# Patient Record
Sex: FEMALE | Race: WHITE | NOT HISPANIC OR LATINO | Employment: PART TIME | URBAN - METROPOLITAN AREA
[De-identification: names, ages, dates, MRNs, and addresses within clinical notes are randomized per-mention and may not be internally consistent; named-entity substitution may affect disease eponyms.]

---

## 2024-02-14 ENCOUNTER — INITIAL PRENATAL (OUTPATIENT)
Dept: OBSTETRICS AND GYNECOLOGY | Facility: CLINIC | Age: 19
End: 2024-02-14
Payer: COMMERCIAL

## 2024-02-14 VITALS
HEIGHT: 64 IN | SYSTOLIC BLOOD PRESSURE: 122 MMHG | DIASTOLIC BLOOD PRESSURE: 78 MMHG | WEIGHT: 166.4 LBS | BODY MASS INDEX: 28.41 KG/M2

## 2024-02-14 DIAGNOSIS — D50.9 IRON DEFICIENCY ANEMIA DURING PREGNANCY: ICD-10-CM

## 2024-02-14 DIAGNOSIS — O26.849 UTERINE SIZE DATE DISCREPANCY PREGNANCY: ICD-10-CM

## 2024-02-14 DIAGNOSIS — Z34.90 PREGNANCY, UNSPECIFIED GESTATIONAL AGE: ICD-10-CM

## 2024-02-14 DIAGNOSIS — O99.019 IRON DEFICIENCY ANEMIA DURING PREGNANCY: ICD-10-CM

## 2024-02-14 DIAGNOSIS — Z34.93 PRENATAL CARE IN THIRD TRIMESTER: Primary | ICD-10-CM

## 2024-02-14 RX ORDER — SENNOSIDES 8.6 MG
650 CAPSULE ORAL EVERY 8 HOURS PRN
COMMUNITY

## 2024-02-14 RX ORDER — PRENATAL VIT NO.126/IRON/FOLIC 28MG-0.8MG
TABLET ORAL DAILY
COMMUNITY

## 2024-02-14 RX ORDER — FERROUS SULFATE 325(65) MG
325 TABLET ORAL
Qty: 30 TABLET | Refills: 4 | Status: SHIPPED | OUTPATIENT
Start: 2024-02-14 | End: 2024-07-13

## 2024-02-15 LAB
ERYTHROCYTE [DISTWIDTH] IN BLOOD BY AUTOMATED COUNT: 13.3 % (ref 12.3–15.4)
FERRITIN SERPL-MCNC: 11.6 NG/ML (ref 13–150)
HCT VFR BLD AUTO: 34.5 % (ref 34–46.6)
HGB BLD-MCNC: 11.6 G/DL (ref 12–15.9)
IRON SATN MFR SERPL: 12 % (ref 20–50)
IRON SERPL-MCNC: 76 MCG/DL (ref 37–145)
MCH RBC QN AUTO: 29.2 PG (ref 26.6–33)
MCHC RBC AUTO-ENTMCNC: 33.6 G/DL (ref 31.5–35.7)
MCV RBC AUTO: 86.9 FL (ref 79–97)
PLATELET # BLD AUTO: 236 10*3/MM3 (ref 140–450)
RBC # BLD AUTO: 3.97 10*6/MM3 (ref 3.77–5.28)
TIBC SERPL-MCNC: 615 MCG/DL
UIBC SERPL-MCNC: 539 MCG/DL (ref 112–346)
WBC # BLD AUTO: 11.33 10*3/MM3 (ref 3.4–10.8)

## 2024-02-21 ENCOUNTER — OFFICE VISIT (OUTPATIENT)
Dept: OBSTETRICS AND GYNECOLOGY | Facility: HOSPITAL | Age: 19
End: 2024-02-21
Payer: COMMERCIAL

## 2024-02-21 ENCOUNTER — ROUTINE PRENATAL (OUTPATIENT)
Dept: OBSTETRICS AND GYNECOLOGY | Facility: CLINIC | Age: 19
End: 2024-02-21
Payer: COMMERCIAL

## 2024-02-21 ENCOUNTER — HOSPITAL ENCOUNTER (OUTPATIENT)
Dept: WOMENS IMAGING | Facility: HOSPITAL | Age: 19
Discharge: HOME | End: 2024-02-21
Admitting: OBSTETRICS & GYNECOLOGY
Payer: COMMERCIAL

## 2024-02-21 ENCOUNTER — HOSPITAL ENCOUNTER (INPATIENT)
Facility: HOSPITAL | Age: 19
LOS: 4 days | Discharge: HOME | End: 2024-02-25
Attending: OBSTETRICS & GYNECOLOGY | Admitting: OBSTETRICS & GYNECOLOGY
Payer: COMMERCIAL

## 2024-02-21 ENCOUNTER — LAB (OUTPATIENT)
Dept: LAB | Facility: HOSPITAL | Age: 19
End: 2024-02-21
Payer: COMMERCIAL

## 2024-02-21 VITALS — BODY MASS INDEX: 29.32 KG/M2 | DIASTOLIC BLOOD PRESSURE: 78 MMHG | WEIGHT: 170.8 LBS | SYSTOLIC BLOOD PRESSURE: 122 MMHG

## 2024-02-21 DIAGNOSIS — Z34.90 PREGNANCY, UNSPECIFIED GESTATIONAL AGE: ICD-10-CM

## 2024-02-21 DIAGNOSIS — O36.5990 POOR FETAL GROWTH AFFECTING MANAGEMENT OF MOTHER, ANTEPARTUM, SINGLE OR UNSPECIFIED FETUS: Primary | ICD-10-CM

## 2024-02-21 DIAGNOSIS — Z34.90 PREGNANCY, UNSPECIFIED GESTATIONAL AGE: Primary | ICD-10-CM

## 2024-02-21 DIAGNOSIS — Z3A.36 36 WEEKS GESTATION OF PREGNANCY: ICD-10-CM

## 2024-02-21 DIAGNOSIS — O26.849 UTERINE SIZE DATE DISCREPANCY PREGNANCY: ICD-10-CM

## 2024-02-21 DIAGNOSIS — O99.019 IRON DEFICIENCY ANEMIA DURING PREGNANCY: ICD-10-CM

## 2024-02-21 DIAGNOSIS — D50.9 IRON DEFICIENCY ANEMIA DURING PREGNANCY: ICD-10-CM

## 2024-02-21 PROBLEM — O36.5931 IUGR (INTRAUTERINE GROWTH RESTRICTION) AFFECTING CARE OF MOTHER, THIRD TRIMESTER, FETUS 1: Status: ACTIVE | Noted: 2024-02-21

## 2024-02-21 LAB
ABO GROUP BLD: NORMAL
ABO GROUP BLD: NORMAL
ALP SERPL-CCNC: 230 U/L (ref 39–117)
ALT SERPL W P-5'-P-CCNC: 15 U/L (ref 1–33)
AST SERPL-CCNC: 25 U/L (ref 1–32)
BILIRUB SERPL-MCNC: 0.2 MG/DL (ref 0–1.2)
BLD GP AB SCN SERPL QL: NEGATIVE
CREAT SERPL-MCNC: 0.57 MG/DL (ref 0.57–1)
DEPRECATED RDW RBC AUTO: 43 FL (ref 37–54)
ERYTHROCYTE [DISTWIDTH] IN BLOOD BY AUTOMATED COUNT: 13.5 % (ref 12.3–15.4)
GLUCOSE UR STRIP-MCNC: NEGATIVE MG/DL
HCT VFR BLD AUTO: 33.3 % (ref 34–46.6)
HGB BLD-MCNC: 11.4 G/DL (ref 12–15.9)
LDH SERPL-CCNC: 219 U/L (ref 135–214)
MCH RBC QN AUTO: 30 PG (ref 26.6–33)
MCHC RBC AUTO-ENTMCNC: 34.2 G/DL (ref 31.5–35.7)
MCV RBC AUTO: 87.6 FL (ref 79–97)
PLATELET # BLD AUTO: 211 10*3/MM3 (ref 140–450)
PMV BLD AUTO: 10.3 FL (ref 6–12)
PROT UR STRIP-MCNC: NEGATIVE MG/DL
RBC # BLD AUTO: 3.8 10*6/MM3 (ref 3.77–5.28)
RH BLD: POSITIVE
RH BLD: POSITIVE
T&S EXPIRATION DATE: NORMAL
TSH SERPL DL<=0.05 MIU/L-ACNC: 2.26 UIU/ML (ref 0.27–4.2)
URATE SERPL-MCNC: 4.5 MG/DL (ref 2.4–5.7)
WBC NRBC COR # BLD AUTO: 12.93 10*3/MM3 (ref 3.4–10.8)

## 2024-02-21 PROCEDURE — 85613 RUSSELL VIPER VENOM DILUTED: CPT | Performed by: OBSTETRICS & GYNECOLOGY

## 2024-02-21 PROCEDURE — 84550 ASSAY OF BLOOD/URIC ACID: CPT | Performed by: OBSTETRICS & GYNECOLOGY

## 2024-02-21 PROCEDURE — 85732 THROMBOPLASTIN TIME PARTIAL: CPT | Performed by: OBSTETRICS & GYNECOLOGY

## 2024-02-21 PROCEDURE — 82247 BILIRUBIN TOTAL: CPT | Performed by: OBSTETRICS & GYNECOLOGY

## 2024-02-21 PROCEDURE — 84443 ASSAY THYROID STIM HORMONE: CPT | Performed by: OBSTETRICS & GYNECOLOGY

## 2024-02-21 PROCEDURE — 84075 ASSAY ALKALINE PHOSPHATASE: CPT | Performed by: OBSTETRICS & GYNECOLOGY

## 2024-02-21 PROCEDURE — 84450 TRANSFERASE (AST) (SGOT): CPT | Performed by: OBSTETRICS & GYNECOLOGY

## 2024-02-21 PROCEDURE — 85705 THROMBOPLASTIN INHIBITION: CPT | Performed by: OBSTETRICS & GYNECOLOGY

## 2024-02-21 PROCEDURE — 87081 CULTURE SCREEN ONLY: CPT

## 2024-02-21 PROCEDURE — 76820 UMBILICAL ARTERY ECHO: CPT

## 2024-02-21 PROCEDURE — 82565 ASSAY OF CREATININE: CPT | Performed by: OBSTETRICS & GYNECOLOGY

## 2024-02-21 PROCEDURE — 81291 MTHFR GENE: CPT | Performed by: OBSTETRICS & GYNECOLOGY

## 2024-02-21 PROCEDURE — 86901 BLOOD TYPING SEROLOGIC RH(D): CPT

## 2024-02-21 PROCEDURE — 76811 OB US DETAILED SNGL FETUS: CPT

## 2024-02-21 PROCEDURE — 86901 BLOOD TYPING SEROLOGIC RH(D): CPT | Performed by: OBSTETRICS & GYNECOLOGY

## 2024-02-21 PROCEDURE — 86900 BLOOD TYPING SEROLOGIC ABO: CPT | Performed by: OBSTETRICS & GYNECOLOGY

## 2024-02-21 PROCEDURE — 84460 ALANINE AMINO (ALT) (SGPT): CPT | Performed by: OBSTETRICS & GYNECOLOGY

## 2024-02-21 PROCEDURE — 86147 CARDIOLIPIN ANTIBODY EA IG: CPT | Performed by: OBSTETRICS & GYNECOLOGY

## 2024-02-21 PROCEDURE — 85670 THROMBIN TIME PLASMA: CPT | Performed by: OBSTETRICS & GYNECOLOGY

## 2024-02-21 PROCEDURE — 83615 LACTATE (LD) (LDH) ENZYME: CPT | Performed by: OBSTETRICS & GYNECOLOGY

## 2024-02-21 PROCEDURE — 86780 TREPONEMA PALLIDUM: CPT | Performed by: OBSTETRICS & GYNECOLOGY

## 2024-02-21 PROCEDURE — 86900 BLOOD TYPING SEROLOGIC ABO: CPT

## 2024-02-21 PROCEDURE — 76819 FETAL BIOPHYS PROFIL W/O NST: CPT

## 2024-02-21 PROCEDURE — 86850 RBC ANTIBODY SCREEN: CPT | Performed by: OBSTETRICS & GYNECOLOGY

## 2024-02-21 PROCEDURE — 25010000002 CEFAZOLIN PER 500 MG: Performed by: OBSTETRICS & GYNECOLOGY

## 2024-02-21 PROCEDURE — 85027 COMPLETE CBC AUTOMATED: CPT | Performed by: OBSTETRICS & GYNECOLOGY

## 2024-02-21 RX ORDER — SODIUM CHLORIDE, SODIUM LACTATE, POTASSIUM CHLORIDE, CALCIUM CHLORIDE 600; 310; 30; 20 MG/100ML; MG/100ML; MG/100ML; MG/100ML
125 INJECTION, SOLUTION INTRAVENOUS CONTINUOUS
Status: DISCONTINUED | OUTPATIENT
Start: 2024-02-21 | End: 2024-02-23

## 2024-02-21 RX ORDER — PROMETHAZINE HYDROCHLORIDE 12.5 MG/1
12.5 TABLET ORAL EVERY 6 HOURS PRN
Status: DISCONTINUED | OUTPATIENT
Start: 2024-02-21 | End: 2024-02-23 | Stop reason: HOSPADM

## 2024-02-21 RX ORDER — MORPHINE SULFATE 2 MG/ML
1 INJECTION, SOLUTION INTRAMUSCULAR; INTRAVENOUS EVERY 4 HOURS PRN
Status: DISCONTINUED | OUTPATIENT
Start: 2024-02-21 | End: 2024-02-23 | Stop reason: HOSPADM

## 2024-02-21 RX ORDER — NALOXONE HCL 0.4 MG/ML
0.4 VIAL (ML) INJECTION
Status: DISCONTINUED | OUTPATIENT
Start: 2024-02-21 | End: 2024-02-23 | Stop reason: HOSPADM

## 2024-02-21 RX ORDER — SODIUM CHLORIDE 0.9 % (FLUSH) 0.9 %
10 SYRINGE (ML) INJECTION EVERY 12 HOURS SCHEDULED
Status: DISCONTINUED | OUTPATIENT
Start: 2024-02-21 | End: 2024-02-23 | Stop reason: HOSPADM

## 2024-02-21 RX ORDER — PROMETHAZINE HYDROCHLORIDE 12.5 MG/1
12.5 SUPPOSITORY RECTAL EVERY 6 HOURS PRN
Status: DISCONTINUED | OUTPATIENT
Start: 2024-02-21 | End: 2024-02-23 | Stop reason: HOSPADM

## 2024-02-21 RX ORDER — ONDANSETRON 2 MG/ML
4 INJECTION INTRAMUSCULAR; INTRAVENOUS EVERY 6 HOURS PRN
Status: DISCONTINUED | OUTPATIENT
Start: 2024-02-21 | End: 2024-02-23 | Stop reason: HOSPADM

## 2024-02-21 RX ORDER — SODIUM CHLORIDE 9 MG/ML
40 INJECTION, SOLUTION INTRAVENOUS AS NEEDED
Status: DISCONTINUED | OUTPATIENT
Start: 2024-02-21 | End: 2024-02-23 | Stop reason: HOSPADM

## 2024-02-21 RX ORDER — MAGNESIUM CARB/ALUMINUM HYDROX 105-160MG
30 TABLET,CHEWABLE ORAL ONCE
Status: DISCONTINUED | OUTPATIENT
Start: 2024-02-21 | End: 2024-02-23 | Stop reason: HOSPADM

## 2024-02-21 RX ORDER — LIDOCAINE HYDROCHLORIDE 10 MG/ML
0.5 INJECTION, SOLUTION EPIDURAL; INFILTRATION; INTRACAUDAL; PERINEURAL ONCE AS NEEDED
Status: DISCONTINUED | OUTPATIENT
Start: 2024-02-21 | End: 2024-02-23 | Stop reason: HOSPADM

## 2024-02-21 RX ORDER — ACETAMINOPHEN 325 MG/1
650 TABLET ORAL EVERY 4 HOURS PRN
Status: DISCONTINUED | OUTPATIENT
Start: 2024-02-21 | End: 2024-02-23 | Stop reason: HOSPADM

## 2024-02-21 RX ORDER — ONDANSETRON 4 MG/1
4 TABLET, ORALLY DISINTEGRATING ORAL EVERY 6 HOURS PRN
Status: DISCONTINUED | OUTPATIENT
Start: 2024-02-21 | End: 2024-02-23 | Stop reason: HOSPADM

## 2024-02-21 RX ORDER — SODIUM CHLORIDE 0.9 % (FLUSH) 0.9 %
10 SYRINGE (ML) INJECTION AS NEEDED
Status: DISCONTINUED | OUTPATIENT
Start: 2024-02-21 | End: 2024-02-23 | Stop reason: HOSPADM

## 2024-02-21 RX ORDER — OXYTOCIN/0.9 % SODIUM CHLORIDE 30/500 ML
2-20 PLASTIC BAG, INJECTION (ML) INTRAVENOUS
Status: DISCONTINUED | OUTPATIENT
Start: 2024-02-21 | End: 2024-02-23 | Stop reason: SDUPTHER

## 2024-02-21 RX ADMIN — SODIUM CHLORIDE 2000 MG: 900 INJECTION INTRAVENOUS at 20:10

## 2024-02-21 RX ADMIN — Medication 2 MILLI-UNITS/MIN: at 20:10

## 2024-02-21 NOTE — PROGRESS NOTES
OB FOLLOW UP  CC- Here for care of pregnancy        Falguni Daniel is a 18 y.o.  36w6d patient being seen today for her obstetrical follow up visit. Patient reports no complaints.     Her prenatal care is complicated by (and status) :   Patient Active Problem List   Diagnosis    Pregnancy    Iron deficiency anemia during pregnancy    Uterine size date discrepancy pregnancy       GBS Status: done today    Allergies   Allergen Reactions    Penicillins Hives          Flu Status: Declines  Her Delivery Plan is: Undecided    US today: yes  Non Stress Test: No.    ROS -   Patient Denies: Loss of Fluid, Vaginal Spotting, Vision Changes, Headaches, Nausea , Vomiting , Contractions, Epigastric pain, and skin itching  Fetal Movement : normal  All other systems reviewed and are negative.       The additional following portions of the patient's history were reviewed and updated as appropriate: allergies, current medications, past family history, past medical history, past social history, past surgical history, and problem list.    I have reviewed and agree with the HPI, ROS, and historical information as entered above.   Frederic Woods MD        EXAM:     Prenatal Vitals  BP: 122/78  Weight: 77.5 kg (170 lb 12.8 oz)   Fetal Heart Rate: 133 US       Dilation/Effacement/Station  Dilation: Fingertip  Effacement (%): 70  Station: 0      Urine Glucose Read-only: Negative  Urine Protein Read-only: Negative           Assessment and Plan    Problem List Items Addressed This Visit              Iron deficiency anemia during pregnancy    Overview     2024 hematocrit 34.5 with low iron saturation and ferritin.  Has iron sulfate 325 mg once daily sent to pharmacy.         Relevant Medications    ferrous sulfate 325 (65 FE) MG tablet    Pregnancy - Primary    Relevant Orders    POC Urinalysis Dipstick (Completed)    Group B Streptococcus Culture - Swab, Vaginal/Rectum    Uterine size date discrepancy pregnancy     Overview     2/14/2024 measures small for dates.  Growth ultrasound next visit.            Pregnancy at 36w6d; B strep screen done today.  IUGR with estimated fetal weight today of 4%.  AC 1%.  Evaluation today by PDC with Dopplers.  Discussed twice weekly fetal surveillance with NST and biophysical profile and possible early delivery.  Reviewed Pre-eclampsia signs/symptoms  Discussed possible induction of labor at 37 to 39 weeks.  Delivery options reviewed with patient  Signs of labor reviewed  Kick counts reviewed  Activity and Exercise discussed.  Return in about 5 days (around 2/26/2024) for NST MON; BPP Fri. .    Frederic Woods MD  02/21/2024

## 2024-02-21 NOTE — H&P
History and Physical   [facility]    No chief complaint on file.        Pregnancy    Iron deficiency anemia during pregnancy    IUGR (intrauterine growth restriction) affecting care of mother, third trimester, fetus 1       Falguni Daniel is a 18 y.o. year old  with an Estimated Date of Delivery: 3/14/24 currently at 36w6d presenting with for induction of labor for IUGR.  She currently reports normal fetal movement, no contractions, no leaking, and no vaginal bleeding    Prenatal care has been with Dr. Woods.  It has been significant for IUGR and ultrasound today at [facility] with elevated Dopplers with delivery recommended. .    Active Hospital Problems    Diagnosis     IUGR (intrauterine growth restriction) affecting care of mother, third trimester, fetus 1     Pregnancy     Iron deficiency anemia during pregnancy         Risks of labor induction including prolongation of labor, increased risks for both  section and operative vaginal birth have been discussed at length.     Denies any headaches vision changes or swelling.  Blood pressures have been normal.    The following portions of the patient's history were reviewed and updated as appropriate:vital signs, allergies, current medications, past medical history, past social history, past surgical history, and problem list.      OBSTETRIC HISTORY  OB History    Para Term  AB Living   1 0 0 0 0 0   SAB IAB Ectopic Molar Multiple Live Births   0 0 0 0 0 0      # Outcome Date GA Lbr Karthik/2nd Weight Sex Delivery Anes PTL Lv   1 Current                 PAST MEDICAL HISTORY  Past Medical History:   Diagnosis Date    PMS (premenstrual syndrome)        PAST SURGICAL HISTORY  Past Surgical History:   Procedure Laterality Date    LAPAROSCOPIC CHOLECYSTECTOMY         ALLERGIES  Penicillins    MEDICATIONS  No current facility-administered medications for this encounter.      Objective     There were no vitals taken for this  "visit.    Physical Exam    General:  well developed; well nourished  no acute distress           Abdomen: soft, non-tender; no masses  fundus firm and non-tender       FHT's: reactive and category 1   Cervix: 1 cm dilated, 75 effaced, -1 station, cervical position posterior   [place]: Contraction are rare     Lab Review       Labs: No data reviewed     Prenatal Labs  Lab Results (last 24 hours)       ** No results found for the last 24 hours. **          Lab Results   Component Value Date    HGB 11.6 (L) 02/14/2024       Imaging   PDC ultrasound reviewed.  Imaging Results (Last 24 Hours)       ** No results found for the last 24 hours. **            Assessment & Plan     ASSESSMENT  IUP at 36w6d  Induction of labor indication: IUGR and elevated Dopplers  Group B strep status:   No results found for: \"STREPGPB\"   4.   Patient's anemia.  Patient has not been taking iron regularly.                Active Hospital Problems    Diagnosis     IUGR (intrauterine growth restriction) affecting care of mother, third trimester, fetus 1      Ultrasound 2/21/2024 with estimated fetal weight of 4 percentile.  AC less than 1%.    2/21/2024 PDC ultrasound with IUGR and elevated Dopplers; needs induction of labor.     NST every Monday and biophysical profile every Friday.      Pregnancy     Iron deficiency anemia during pregnancy      2/14/2024 hematocrit 34.5 with low iron saturation and ferritin.  Has iron sulfate 325 mg once daily sent to pharmacy.          PLAN  Induction of labor with cervical ripening with Clark balloon and low-dose Pitocin overnight.         Frederic Woods MD  2/21/202417:50 EST  "

## 2024-02-22 ENCOUNTER — ANESTHESIA (OUTPATIENT)
Dept: LABOR AND DELIVERY | Facility: HOSPITAL | Age: 19
End: 2024-02-22
Payer: COMMERCIAL

## 2024-02-22 ENCOUNTER — ANESTHESIA EVENT (OUTPATIENT)
Dept: LABOR AND DELIVERY | Facility: HOSPITAL | Age: 19
End: 2024-02-22
Payer: COMMERCIAL

## 2024-02-22 LAB — T PALLIDUM IGG SER QL: NORMAL

## 2024-02-22 PROCEDURE — 25010000002 ROPIVACAINE PER 1 MG: Performed by: NURSE ANESTHETIST, CERTIFIED REGISTERED

## 2024-02-22 PROCEDURE — 51702 INSERT TEMP BLADDER CATH: CPT

## 2024-02-22 PROCEDURE — 59200 INSERT CERVICAL DILATOR: CPT | Performed by: OBSTETRICS & GYNECOLOGY

## 2024-02-22 PROCEDURE — 25810000003 LACTATED RINGERS PER 1000 ML: Performed by: OBSTETRICS & GYNECOLOGY

## 2024-02-22 PROCEDURE — 25010000002 METOCLOPRAMIDE PER 10 MG: Performed by: NURSE ANESTHETIST, CERTIFIED REGISTERED

## 2024-02-22 PROCEDURE — 25810000003 LACTATED RINGERS SOLUTION: Performed by: OBSTETRICS & GYNECOLOGY

## 2024-02-22 PROCEDURE — 25810000003 LACTATED RINGERS SOLUTION: Performed by: NURSE ANESTHETIST, CERTIFIED REGISTERED

## 2024-02-22 PROCEDURE — C1755 CATHETER, INTRASPINAL: HCPCS

## 2024-02-22 PROCEDURE — 59025 FETAL NON-STRESS TEST: CPT

## 2024-02-22 PROCEDURE — 25010000002 FENTANYL CITRATE (PF) 50 MCG/ML SOLUTION: Performed by: NURSE ANESTHETIST, CERTIFIED REGISTERED

## 2024-02-22 PROCEDURE — 25010000002 ONDANSETRON PER 1 MG: Performed by: OBSTETRICS & GYNECOLOGY

## 2024-02-22 PROCEDURE — 25010000002 CEFAZOLIN PER 500 MG: Performed by: OBSTETRICS & GYNECOLOGY

## 2024-02-22 PROCEDURE — C1755 CATHETER, INTRASPINAL: HCPCS | Performed by: ANESTHESIOLOGY

## 2024-02-22 RX ORDER — METOCLOPRAMIDE HYDROCHLORIDE 5 MG/ML
10 INJECTION INTRAMUSCULAR; INTRAVENOUS ONCE AS NEEDED
Status: DISCONTINUED | OUTPATIENT
Start: 2024-02-22 | End: 2024-02-23 | Stop reason: HOSPADM

## 2024-02-22 RX ORDER — CITRIC ACID/SODIUM CITRATE 334-500MG
30 SOLUTION, ORAL ORAL ONCE
Status: DISCONTINUED | OUTPATIENT
Start: 2024-02-22 | End: 2024-02-23 | Stop reason: HOSPADM

## 2024-02-22 RX ORDER — FENTANYL CITRATE 50 UG/ML
INJECTION, SOLUTION INTRAMUSCULAR; INTRAVENOUS AS NEEDED
Status: DISCONTINUED | OUTPATIENT
Start: 2024-02-22 | End: 2024-02-23 | Stop reason: SURG

## 2024-02-22 RX ORDER — FAMOTIDINE 10 MG/ML
20 INJECTION, SOLUTION INTRAVENOUS ONCE
Status: COMPLETED | OUTPATIENT
Start: 2024-02-22 | End: 2024-02-22

## 2024-02-22 RX ORDER — DIPHENHYDRAMINE HYDROCHLORIDE 50 MG/ML
12.5 INJECTION INTRAMUSCULAR; INTRAVENOUS EVERY 8 HOURS PRN
Status: DISCONTINUED | OUTPATIENT
Start: 2024-02-22 | End: 2024-02-23 | Stop reason: HOSPADM

## 2024-02-22 RX ORDER — FAMOTIDINE 10 MG/ML
20 INJECTION, SOLUTION INTRAVENOUS ONCE AS NEEDED
Status: DISCONTINUED | OUTPATIENT
Start: 2024-02-22 | End: 2024-02-23 | Stop reason: HOSPADM

## 2024-02-22 RX ORDER — EPHEDRINE SULFATE 5 MG/ML
INJECTION INTRAVENOUS
Status: DISCONTINUED
Start: 2024-02-22 | End: 2024-02-23 | Stop reason: WASHOUT

## 2024-02-22 RX ORDER — EPHEDRINE SULFATE 5 MG/ML
10 INJECTION INTRAVENOUS
Status: DISCONTINUED | OUTPATIENT
Start: 2024-02-22 | End: 2024-02-23 | Stop reason: HOSPADM

## 2024-02-22 RX ORDER — OXYTOCIN/0.9 % SODIUM CHLORIDE 30/500 ML
2-20 PLASTIC BAG, INJECTION (ML) INTRAVENOUS
Status: DISCONTINUED | OUTPATIENT
Start: 2024-02-22 | End: 2024-02-23 | Stop reason: SDUPTHER

## 2024-02-22 RX ORDER — METOCLOPRAMIDE HYDROCHLORIDE 5 MG/ML
10 INJECTION INTRAMUSCULAR; INTRAVENOUS ONCE
Status: COMPLETED | OUTPATIENT
Start: 2024-02-22 | End: 2024-02-22

## 2024-02-22 RX ORDER — LIDOCAINE HCL/EPINEPHRINE/PF 2%-1:200K
VIAL (ML) INJECTION AS NEEDED
Status: DISCONTINUED | OUTPATIENT
Start: 2024-02-22 | End: 2024-02-23 | Stop reason: SURG

## 2024-02-22 RX ORDER — ROPIVACAINE HYDROCHLORIDE 2 MG/ML
15 INJECTION, SOLUTION EPIDURAL; INFILTRATION; PERINEURAL CONTINUOUS
Status: DISCONTINUED | OUTPATIENT
Start: 2024-02-22 | End: 2024-02-23

## 2024-02-22 RX ORDER — ONDANSETRON 2 MG/ML
4 INJECTION INTRAMUSCULAR; INTRAVENOUS ONCE AS NEEDED
Status: DISCONTINUED | OUTPATIENT
Start: 2024-02-22 | End: 2024-02-23 | Stop reason: HOSPADM

## 2024-02-22 RX ORDER — LIDOCAINE HYDROCHLORIDE AND EPINEPHRINE 15; 5 MG/ML; UG/ML
INJECTION, SOLUTION EPIDURAL AS NEEDED
Status: DISCONTINUED | OUTPATIENT
Start: 2024-02-22 | End: 2024-02-23 | Stop reason: SURG

## 2024-02-22 RX ADMIN — FENTANYL CITRATE 100 MCG: 50 INJECTION, SOLUTION INTRAMUSCULAR; INTRAVENOUS at 12:27

## 2024-02-22 RX ADMIN — Medication 2 MILLI-UNITS/MIN: at 15:13

## 2024-02-22 RX ADMIN — FAMOTIDINE 20 MG: 10 INJECTION, SOLUTION INTRAVENOUS at 10:07

## 2024-02-22 RX ADMIN — LIDOCAINE HYDROCHLORIDE AND EPINEPHRINE 3 ML: 15; 5 INJECTION, SOLUTION EPIDURAL at 12:24

## 2024-02-22 RX ADMIN — ONDANSETRON 4 MG: 2 INJECTION INTRAMUSCULAR; INTRAVENOUS at 20:56

## 2024-02-22 RX ADMIN — CEFAZOLIN 1000 MG: 1 INJECTION, POWDER, FOR SOLUTION INTRAMUSCULAR; INTRAVENOUS at 10:06

## 2024-02-22 RX ADMIN — LIDOCAINE HYDROCHLORIDE AND EPINEPHRINE 6 ML: 20; 5 INJECTION, SOLUTION EPIDURAL; INFILTRATION; INTRACAUDAL; PERINEURAL at 20:49

## 2024-02-22 RX ADMIN — SODIUM CHLORIDE, POTASSIUM CHLORIDE, SODIUM LACTATE AND CALCIUM CHLORIDE 1000 ML: 600; 310; 30; 20 INJECTION, SOLUTION INTRAVENOUS at 11:21

## 2024-02-22 RX ADMIN — ONDANSETRON 4 MG: 2 INJECTION INTRAMUSCULAR; INTRAVENOUS at 08:13

## 2024-02-22 RX ADMIN — CEFAZOLIN 1000 MG: 1 INJECTION, POWDER, FOR SOLUTION INTRAMUSCULAR; INTRAVENOUS at 02:55

## 2024-02-22 RX ADMIN — CEFAZOLIN 1000 MG: 1 INJECTION, POWDER, FOR SOLUTION INTRAMUSCULAR; INTRAVENOUS at 18:39

## 2024-02-22 RX ADMIN — ROPIVACAINE HYDROCHLORIDE 10 ML: 5 INJECTION, SOLUTION EPIDURAL; INFILTRATION; PERINEURAL at 12:31

## 2024-02-22 RX ADMIN — SODIUM CHLORIDE, POTASSIUM CHLORIDE, SODIUM LACTATE AND CALCIUM CHLORIDE 125 ML/HR: 600; 310; 30; 20 INJECTION, SOLUTION INTRAVENOUS at 18:19

## 2024-02-22 RX ADMIN — METOCLOPRAMIDE 10 MG: 5 INJECTION, SOLUTION INTRAMUSCULAR; INTRAVENOUS at 10:35

## 2024-02-22 RX ADMIN — ROPIVACAINE HYDROCHLORIDE 15 ML/HR: 2 INJECTION, SOLUTION EPIDURAL; INFILTRATION at 12:32

## 2024-02-22 RX ADMIN — SODIUM CHLORIDE, POTASSIUM CHLORIDE, SODIUM LACTATE AND CALCIUM CHLORIDE 1000 ML: 600; 310; 30; 20 INJECTION, SOLUTION INTRAVENOUS at 12:08

## 2024-02-22 NOTE — PROGRESS NOTES
Patient was unable to tolerate pacemaker Clark balloon last night.  Little change in cervix after Cervidil overnight.    Decision made to place epidural so that patient could tolerate Clark balloon placement.  Patient was allowed to eat breakfast this morning and anesthesia requested to wait several hours after eating prior to placing epidural.  Epidural was placed without difficulty at 12:30 PM.    Patient experienced spontaneous rupture of membranes at approximately 1 PM; which was prior to Clark bulb placement.    Clark balloon was inserted by laborist with speculum to posterior cervix.  Approximately 1 cm and 50%.    Patient is currently comfortable and sleeping with epidural.    Afebrile and vital signs stable.    FHTs are category 1.  No decelerations noted.  Contractions every 3 minutes and palpate as mild.    Plan is to continue Pitocin per protocol.  Possible need for IUPC to titrate Pitocin once Clark balloon out and an active labor.    Discussed with Dr. Mancera who is on-call tonight.

## 2024-02-22 NOTE — ANESTHESIA PROCEDURE NOTES
Labor Epidural      Patient reassessed immediately prior to procedure    Patient location during procedure: floor  Performed By  CRNA/JENN: Rachel Haque CRNA  Preanesthetic Checklist  Completed: patient identified, IV checked, site marked, risks and benefits discussed, surgical consent, monitors and equipment checked, pre-op evaluation and timeout performed  Prep:  Pt Position:sitting  Sterile Tech:cap, gloves, mask and sterile barrier  Prep:DuraPrep  Monitoring:blood pressure monitoring  Epidural Block Procedure:  Approach:midline  Guidance:landmark technique and palpation technique  Location:L3-L4  Needle Type:Tuohy  Needle Gauge:17 G  Loss of Resistance Medium: air  Loss of Resistance: 6cm  Cath Depth at skin:11 cm  Paresthesia: none  Aspiration:negative  Test Dose:negative  Number of Attempts: 1  Post Assessment:  Dressing:occlusive dressing applied and secured with tape  Pt Tolerance:patient tolerated the procedure well with no apparent complications  Complications:no

## 2024-02-22 NOTE — PROGRESS NOTES
IUP @ 37 weeks  IUGR  MIOL  Unfavorable cervix  Now with epidural  Transcervical green placed per physician request.  FHT's class 1.  Patient tolerated well. 24 [place], 60ml.    Cristina Brooks MD  2/22/2024  16:37 EST

## 2024-02-22 NOTE — PROGRESS NOTES
"[facility]  Progress Note    Chief Complaint:  Chief Complaint   Patient presents with    Scheduled Induction       Subjective     Patient:    The patient feels well this morning with category I tracing overnight. A second attempt at green bulb placement performed in the middle of the night but patient does not tolerate exams well.  I explained that her cervix is now soft and would benefit from green bulb and could be placed once she is comfortable.       Objective     Vital Signs Range for the last 24 hours  Temp:  [97.7 °F (36.5 °C)] 97.7 °F (36.5 °C)   Temp src: Oral   BP: (122-129)/(78-89) 129/89                       Weight:  [77.1 kg (170 lb)-77.5 kg (170 lb 12.8 oz)] 77.1 kg (170 lb)       Flowsheet Rows      Flowsheet Row First Filed Value   Admission Height 162.6 cm (64\") Documented at 02/21/2024 1758   Admission Weight 77.1 kg (170 lb) Documented at 02/21/2024 1758              Physical Exam:  Abdomen Abdominal exam: uterus is nontender   Extremities Exam of extremities: peripheral pulses normal, no pedal edema, no clubbing or cyanosis     Presentation: vertex   Cervix: Exam by:  Anish Arnett MD     Dilation:  ft   Effacement:  50   Station:  -2         Fetal Heart Rate Assessment   Method:     Beats/min:     Baseline:     Variability:     Accels:     Decels:     Tracing Category:       Uterine Assessment   Method:     Frequency (min):     Ctx Count in 10 min:     Duration:     Intensity:     Intensity by IUPC:     Resting Tone:     Resting Tone by IUPC:     [facility] Units:         Assessment & Plan       IUGR (intrauterine growth restriction) affecting care of mother    Pregnancy    Iron deficiency anemia during pregnancy    IUGR (intrauterine growth restriction) affecting care of mother, third trimester, fetus 1        Assessment:  1.  Intrauterine pregnancy at 37w0d weeks gestation with reactive fetal status.    2.  IUGR  3.  Obstetrical history significant for  IUGR and " "unfavorable cervix .  4.  GBS status: No results found for: \"GBSANTIGEN\".     Plan:  1. Discussed plan with patient and family and will let her eat this morning, followed by epidural placement.  Plan for green bulb placement with pitocin after she is comfortable.   2. Plan of care has been reviewed with patient.  3.  Risks, benefits of treatment plan have been discussed.  4.  All questions have been answered.  .      Anish Arnett MD  2/22/2024  06:51 EST    "

## 2024-02-22 NOTE — PROGRESS NOTES
Patient unable to tolerate the speculum for the placement of the green bulb  I attempted to place it with my fingers but cervix is posterior, closed, thick, and firm - baby's cephalic presentation confirmed with handheld ultrasound, cervix is located behind the baby's head which is at 0 station    Will try cervidil for cervical ripening

## 2024-02-22 NOTE — ANESTHESIA PREPROCEDURE EVALUATION
Anesthesia Evaluation     Patient summary reviewed and Nursing notes reviewed   NPO Solid Status: > 6 hours  NPO Liquid Status: < 2 hours           Airway   Mallampati: II  TM distance: >3 FB  Neck ROM: full  Dental      Pulmonary - negative pulmonary ROS   Cardiovascular - negative cardio ROS        Neuro/Psych- negative ROS  GI/Hepatic/Renal/Endo - negative ROS     Musculoskeletal (-) negative ROS    Abdominal    Substance History - negative use     OB/GYN    (+) Pregnant        Other - negative ROS                   Anesthesia Plan    ASA 2     epidural       Anesthetic plan, risks, benefits, and alternatives have been provided, discussed and informed consent has been obtained with: patient.    Plan discussed with attending.    CODE STATUS:    Level Of Support Discussed With: Patient  Code Status (Patient has no pulse and is not breathing): CPR (Attempt to Resuscitate)  Medical Interventions (Patient has pulse or is breathing): Full Support

## 2024-02-23 LAB
CARDIOLIPIN IGA SER IA-ACNC: <9 APL U/ML (ref 0–11)
CARDIOLIPIN IGG SER IA-ACNC: <9 GPL U/ML (ref 0–14)
CARDIOLIPIN IGM SER IA-ACNC: 14 MPL U/ML (ref 0–12)

## 2024-02-23 PROCEDURE — 0KQM0ZZ REPAIR PERINEUM MUSCLE, OPEN APPROACH: ICD-10-PCS | Performed by: OBSTETRICS & GYNECOLOGY

## 2024-02-23 PROCEDURE — 59410 OBSTETRICAL CARE: CPT | Performed by: OBSTETRICS & GYNECOLOGY

## 2024-02-23 PROCEDURE — 25010000002 ROPIVACAINE PER 1 MG: Performed by: NURSE ANESTHETIST, CERTIFIED REGISTERED

## 2024-02-23 RX ORDER — PROMETHAZINE HYDROCHLORIDE 25 MG/1
25 TABLET ORAL EVERY 6 HOURS PRN
Status: DISCONTINUED | OUTPATIENT
Start: 2024-02-23 | End: 2024-02-25 | Stop reason: HOSPADM

## 2024-02-23 RX ORDER — BISACODYL 10 MG
10 SUPPOSITORY, RECTAL RECTAL DAILY PRN
Status: DISCONTINUED | OUTPATIENT
Start: 2024-02-24 | End: 2024-02-25 | Stop reason: HOSPADM

## 2024-02-23 RX ORDER — HYDROCODONE BITARTRATE AND ACETAMINOPHEN 10; 325 MG/1; MG/1
1 TABLET ORAL EVERY 4 HOURS PRN
Status: DISCONTINUED | OUTPATIENT
Start: 2024-02-23 | End: 2024-02-25 | Stop reason: HOSPADM

## 2024-02-23 RX ORDER — CARBOPROST TROMETHAMINE 250 UG/ML
250 INJECTION, SOLUTION INTRAMUSCULAR AS NEEDED
Status: DISCONTINUED | OUTPATIENT
Start: 2024-02-23 | End: 2024-02-23 | Stop reason: HOSPADM

## 2024-02-23 RX ORDER — OXYTOCIN/0.9 % SODIUM CHLORIDE 30/500 ML
999 PLASTIC BAG, INJECTION (ML) INTRAVENOUS ONCE
Status: DISCONTINUED | OUTPATIENT
Start: 2024-02-23 | End: 2024-02-23 | Stop reason: SDUPTHER

## 2024-02-23 RX ORDER — HYDROCORTISONE 25 MG/G
1 CREAM TOPICAL AS NEEDED
Status: DISCONTINUED | OUTPATIENT
Start: 2024-02-23 | End: 2024-02-25 | Stop reason: HOSPADM

## 2024-02-23 RX ORDER — OXYTOCIN/0.9 % SODIUM CHLORIDE 30/500 ML
250 PLASTIC BAG, INJECTION (ML) INTRAVENOUS CONTINUOUS
Status: DISCONTINUED | OUTPATIENT
Start: 2024-02-23 | End: 2024-02-23 | Stop reason: SDUPTHER

## 2024-02-23 RX ORDER — MISOPROSTOL 200 UG/1
800 TABLET ORAL AS NEEDED
Status: DISCONTINUED | OUTPATIENT
Start: 2024-02-23 | End: 2024-02-23 | Stop reason: HOSPADM

## 2024-02-23 RX ORDER — IBUPROFEN 600 MG/1
600 TABLET ORAL EVERY 6 HOURS PRN
Status: DISCONTINUED | OUTPATIENT
Start: 2024-02-23 | End: 2024-02-25 | Stop reason: HOSPADM

## 2024-02-23 RX ORDER — OXYTOCIN/0.9 % SODIUM CHLORIDE 30/500 ML
125 PLASTIC BAG, INJECTION (ML) INTRAVENOUS ONCE AS NEEDED
Status: DISCONTINUED | OUTPATIENT
Start: 2024-02-23 | End: 2024-02-25 | Stop reason: HOSPADM

## 2024-02-23 RX ORDER — METHYLERGONOVINE MALEATE 0.2 MG/ML
200 INJECTION INTRAVENOUS ONCE AS NEEDED
Status: DISCONTINUED | OUTPATIENT
Start: 2024-02-23 | End: 2024-02-23 | Stop reason: HOSPADM

## 2024-02-23 RX ORDER — DOCUSATE SODIUM 100 MG/1
100 CAPSULE, LIQUID FILLED ORAL 2 TIMES DAILY
Status: DISCONTINUED | OUTPATIENT
Start: 2024-02-23 | End: 2024-02-25 | Stop reason: HOSPADM

## 2024-02-23 RX ORDER — ACETAMINOPHEN 325 MG/1
650 TABLET ORAL EVERY 6 HOURS PRN
Status: DISCONTINUED | OUTPATIENT
Start: 2024-02-23 | End: 2024-02-25 | Stop reason: HOSPADM

## 2024-02-23 RX ORDER — HYDROCODONE BITARTRATE AND ACETAMINOPHEN 5; 325 MG/1; MG/1
1 TABLET ORAL EVERY 4 HOURS PRN
Status: DISCONTINUED | OUTPATIENT
Start: 2024-02-23 | End: 2024-02-25 | Stop reason: HOSPADM

## 2024-02-23 RX ADMIN — IBUPROFEN 600 MG: 600 TABLET, FILM COATED ORAL at 18:58

## 2024-02-23 RX ADMIN — Medication: at 06:31

## 2024-02-23 RX ADMIN — ROPIVACAINE HYDROCHLORIDE 16 ML/HR: 2 INJECTION, SOLUTION EPIDURAL; INFILTRATION at 01:50

## 2024-02-23 RX ADMIN — WITCH HAZEL 1 PAD: 500 SOLUTION RECTAL; TOPICAL at 06:31

## 2024-02-23 RX ADMIN — DOCUSATE SODIUM 100 MG: 100 CAPSULE, LIQUID FILLED ORAL at 20:27

## 2024-02-23 RX ADMIN — DOCUSATE SODIUM 100 MG: 100 CAPSULE, LIQUID FILLED ORAL at 08:43

## 2024-02-23 RX ADMIN — LIDOCAINE HYDROCHLORIDE AND EPINEPHRINE 6 ML: 20; 5 INJECTION, SOLUTION EPIDURAL; INFILTRATION; INTRACAUDAL; PERINEURAL at 00:23

## 2024-02-23 RX ADMIN — ACETAMINOPHEN 650 MG: 325 TABLET ORAL at 12:55

## 2024-02-23 RX ADMIN — ACETAMINOPHEN 650 MG: 325 TABLET ORAL at 22:39

## 2024-02-23 RX ADMIN — IBUPROFEN 600 MG: 600 TABLET, FILM COATED ORAL at 09:48

## 2024-02-23 RX ADMIN — Medication 1 APPLICATION: at 06:32

## 2024-02-23 NOTE — LACTATION NOTE
24 1315   Maternal Information   Date of Referral 24   Person Making Referral physician   Maternal Reason for Referral no prior breastfeeding experience   Infant Reason for Referral 35-37 weeks gestation;low birth weight   Maternal Assessment   Breast Size Issue none   Breast Shape Bilateral:;round   Breast Density Bilateral:;soft   Nipples Bilateral:;graspable;short   Left Nipple Symptoms intact;nontender   Right Nipple Symptoms intact;nontender   Maternal Infant Feeding   Maternal Emotional State receptive;tense   Infant Positioning clutch/football  (Mom had just breast-fed on the left in football and switched to right football.)   Signs of Milk Transfer   (Too sleepy to latch on the right.  Left skin to skin.  While LC got insurance pump ready to give mom.)   Comfort Measures Before/During Feeding infant position adjusted;maternal position adjusted  (Baby just fed for 13 minutes on the left and football, so did not use a nipple shield on the right.)   Latch Assistance minimal assistance   Support Person Involvement actively supporting mother  (mom is support person)   Breastfeeding Supplementation   Infant Indication for Supplementation low birth weight  (late . Pediatrician ordered supplementation after breastfeeding.)   Breastfeeding Supplementation Type   (mom is to pump and give baby any pumped milk after the next feeding. If she doesn't get anything with pumping, she is to give 10-15 ml of neosure. Mom prefers to use syringes for today and can revisit syringes vs bottles tomorrow.)   Milk Expression/Equipment   Breast Pump Type double electric, personal  (delivered personal insurance pump. Gave QR code for instructional video and gave microwave steam bag. Discussed how to use and sterilized mom's pump parts.)   Breast Pump Flange Type hard   Breast Pump Flange Size 24 mm   Breast Pumping   Breast Pumping Interventions post-feed pumping encouraged  (Mom has not pumped yet. Gave syringes  and discussed how to pull up small volumes of colostrum and finger/syringe feed to baby.)     Came in at end of baby breastfeeding on the left and baby had colostrum around mouth. Helped mom with position and discussed good latch. Answered questions and encouraged mom to begin pumping after feedings. Offered Dr Chaidez's bottle to use for supplementation, but mom prefers to use finger or pacifier and syringes for now. Encouraged as much skin to skin as possible. To call for lactation services if there are questions or concerns, or if mom wants help with a breast-feeding.

## 2024-02-23 NOTE — L&D DELIVERY NOTE
" [facility]   Vaginal Delivery Note    Patient Name: Falguni Daniel  : 2005  MRN: [id number]    Date of Delivery: 2024     Diagnosis     Pre & Post-Delivery:  Intrauterine pregnancy at 37w1d  Labor status: Induced Onset of Labor     IUGR (intrauterine growth restriction) affecting care of mother    Pregnancy    Iron deficiency anemia during pregnancy    IUGR (intrauterine growth restriction) affecting care of mother, third trimester, fetus 1             Problem List    Transfer to Postpartum     Review the Delivery Report for details.     Delivery     Delivery: Vaginal, Spontaneous     YOB: 2026    Time of Birth:  Gestational Age 2:45 AM   37w1d     Anesthesia: Epidural     Delivering clinician: Shelly Mancera    Forceps?   No   Vacuum? No    Shoulder dystocia present: No        Delivery narrative: Patient delivered vaginally over no episiotomy under epidural a male.  Tight nuchal cord on perineum was released 1 loop.  Baby suction baby delivered baby handed off to nurses.  Baby was crying.  Cord blood was obtained placenta expressed uterus explored.  Second-degree vaginal tear repaired with 2-0 chromic running lock stitch.  .  Baby and mom doing well.      Infant     Findings: female  infant     Infant observations: Weight: 2450 g (5 lb 6.4 oz)   Length: 18  in  Observations/Comments:        Apgars:   @ 1 minute /      @ 5 minutes   Infant Name:      Placenta & Cord         Placenta delivered  Spontaneous  at   2024  2:48 AM     Cord: 3 vessels  present.   Nuchal Cord?  yes; Number of nuchal loops present:  1    Cord blood obtained: Yes    Cord gases obtained:  No    Cord gas results: Venous:  No results found for: \"PHCVEN\", \"BECVEN\"    Arterial:  No results found for: \"PHCART\", \"BECART\"     Repair     Episiotomy: None     No    Lacerations: Yes  Laceration Information  Laceration Repaired?   Perineal: 2nd  Yes    Periurethral:       Labial:       Sulcus:     "   Vaginal:       Cervical:         Suture used for repair: 2-0 chromic gut  Laceration Length for 3rd or 4th degree lacerations: There were no third or fourth degree lacerations cm   Estimated Blood Loss:  150     Quantitative Blood Loss:  150        Complications     none    Disposition     Mother to Mother Baby/Postpartum  in stable condition currently.  Baby to NBN  in stable condition currently.    Shelly Mancera MD  02/23/24  03:12 EST

## 2024-02-23 NOTE — PAYOR COMM NOTE
"Jose Juan Jones (18 y.o. Female)       Date of Birth   2005    Social Security Number       Address   [address] [place], [place] [address]    Home Phone   [phone]    MRN   [id number]       Congregational   None    Marital Status   Single                            Admission Date   2/21/24    Admission Type   Elective    Admitting Provider   Frederic Woods MD    Attending Provider   Frederic Woods MD    Department, Room/Bed   [facility] MOTHER BABY 4A, N409/1       Discharge Date       Discharge Disposition       Discharge Destination                                 Attending Provider: Frederic Woods MD    Allergies: Penicillins    Isolation: None   Infection: None   Code Status: CPR    Ht: 162.6 cm (64\")   Wt: 77.1 kg (170 lb)    Admission Cmt: None   Principal Problem: IUGR (intrauterine growth restriction) affecting care of mother [O36.5990]                   Active Insurance as of 2/21/2024       Primary Coverage       Payor Plan Insurance Group Employer/Plan Group    [employer] [place] [employer] [place]ICA[place]        Payor Plan Address Payor Plan Phone Number Payor Plan Fax Number Effective Dates    [address] [address] [phone]  2/2/2024 - None Entered    [facility] [id number]         Subscriber Name Subscriber Birth Date Member ID       JOSE JUAN JONES 2005 [id number]                     Emergency Contacts        (Rel.) Home Phone Work Phone Mobile Phone    HERNANDEZ BEAN (Mother) [phone] -- [phone]              Insurance Information                  [facility]/WELLCARE MEDICAID Phone: [phone]    Subscriber: Jose Juan Jones Subscriber#: [id number]    Group#: -- Precert#: --             History & Physical        Frederic Woods MD at 02/21/24 5870          History and Physical   [facility]    No chief complaint on file.        Pregnancy    Iron deficiency anemia during pregnancy    IUGR (intrauterine growth " restriction) affecting care of mother, third trimester, fetus 1       Falguni Daniel is a 18 y.o. year old  with an Estimated Date of Delivery: 3/14/24 currently at 36w6d presenting with for induction of labor for IUGR.  She currently reports normal fetal movement, no contractions, no leaking, and no vaginal bleeding    Prenatal care has been with Dr. Woods.  It has been significant for IUGR and ultrasound today at [facility] with elevated Dopplers with delivery recommended. .    Active Hospital Problems    Diagnosis     IUGR (intrauterine growth restriction) affecting care of mother, third trimester, fetus 1     Pregnancy     Iron deficiency anemia during pregnancy         Risks of labor induction including prolongation of labor, increased risks for both  section and operative vaginal birth have been discussed at length.     Denies any headaches vision changes or swelling.  Blood pressures have been normal.    The following portions of the patient's history were reviewed and updated as appropriate:vital signs, allergies, current medications, past medical history, past social history, past surgical history, and problem list.      OBSTETRIC HISTORY  OB History    Para Term  AB Living   1 0 0 0 0 0   SAB IAB Ectopic Molar Multiple Live Births   0 0 0 0 0 0      # Outcome Date GA Lbr Karthik/2nd Weight Sex Delivery Anes PTL Lv   1 Current                 PAST MEDICAL HISTORY  Past Medical History:   Diagnosis Date    PMS (premenstrual syndrome)        PAST SURGICAL HISTORY  Past Surgical History:   Procedure Laterality Date    LAPAROSCOPIC CHOLECYSTECTOMY         ALLERGIES  Penicillins    MEDICATIONS  No current facility-administered medications for this encounter.      Objective    There were no vitals taken for this visit.    Physical Exam    General:  well developed; well nourished  no acute distress           Abdomen: soft, non-tender; no masses  fundus firm and non-tender       FHT's:  "reactive and category 1   Cervix: 1 cm dilated, 75 effaced, -1 station, cervical position posterior   Soddy-Daisy: Contraction are rare     Lab Review       Labs: No data reviewed     Prenatal Labs  Lab Results (last 24 hours)       ** No results found for the last 24 hours. **          Lab Results   Component Value Date    HGB 11.6 (L) 2024       Imaging   PDC ultrasound reviewed.  Imaging Results (Last 24 Hours)       ** No results found for the last 24 hours. **            Assessment & Plan    ASSESSMENT  IUP at 36w6d  Induction of labor indication: IUGR and elevated Dopplers  Group B strep status:   No results found for: \"STREPGPB\"   4.   Patient's anemia.  Patient has not been taking iron regularly.                Active Hospital Problems    Diagnosis     IUGR (intrauterine growth restriction) affecting care of mother, third trimester, fetus 1      Ultrasound 2024 with estimated fetal weight of 4 percentile.  AC less than 1%.    2024 PDC ultrasound with IUGR and elevated Dopplers; needs induction of labor.     NST every Monday and biophysical profile every Friday.      Pregnancy     Iron deficiency anemia during pregnancy      2024 hematocrit 34.5 with low iron saturation and ferritin.  Has iron sulfate 325 mg once daily sent to pharmacy.          PLAN  Induction of labor with cervical ripening with Clark balloon and low-dose Pitocin overnight.         Frederic Woods MD  7:50 EST    Electronically signed by Frederic Woods MD at 24 1754          Operative/Procedure Notes (last 48 hours)        Shelly Mancera MD at 24 0312           [facility]   Vaginal Delivery Note    Patient Name: Falguni Daniel  : 2005  MRN: [id number]    Date of Delivery: 2024     Diagnosis     Pre & Post-Delivery:  Intrauterine pregnancy at 37w1d  Labor status: Induced Onset of Labor     IUGR (intrauterine growth restriction) affecting care of mother    Pregnancy    Iron " "deficiency anemia during pregnancy    IUGR (intrauterine growth restriction) affecting care of mother, third trimester, fetus 1             Problem List    Transfer to Postpartum     Review the Delivery Report for details.     Delivery     Delivery: Vaginal, Spontaneous     YOB: 2026    Time of Birth:  Gestational Age 2:45 AM   37w1d     Anesthesia: Epidural     Delivering clinician: Shelly Mancera    Forceps?   No   Vacuum? No    Shoulder dystocia present: No        Delivery narrative: Patient delivered vaginally over no episiotomy under epidural a male.  Tight nuchal cord on perineum was released 1 loop.  Baby suction baby delivered baby handed off to nurses.  Baby was crying.  Cord blood was obtained placenta expressed uterus explored.  Second-degree vaginal tear repaired with 2-0 chromic running lock stitch.  .  Baby and mom doing well.      Infant     Findings: female  infant     Infant observations: Weight: 2450 g (5 lb 6.4 oz)   Length: 18  in  Observations/Comments:        Apgars:   @ 1 minute /      @ 5 minutes   Infant Name:      Placenta & Cord         Placenta delivered  Spontaneous  at   2/23/2024  2:48 AM     Cord: 3 vessels  present.   Nuchal Cord?  yes; Number of nuchal loops present:  1    Cord blood obtained: Yes    Cord gases obtained:  No    Cord gas results: Venous:  No results found for: \"PHCVEN\", \"BECVEN\"    Arterial:  No results found for: \"PHCART\", \"BECART\"     Repair     Episiotomy: None     No    Lacerations: Yes  Laceration Information  Laceration Repaired?   Perineal: 2nd  Yes    Periurethral:       Labial:       Sulcus:       Vaginal:       Cervical:         Suture used for repair: 2-0 chromic gut  Laceration Length for 3rd or 4th degree lacerations: There were no third or fourth degree lacerations cm   Estimated Blood Loss:  150     Quantitative Blood Loss:  150        Complications     none    Disposition     Mother to Mother Baby/Postpartum  in stable " condition currently.  Baby to NBN  in stable condition currently.    Shelly Mancera MD  02/23/24  03:12 EST          Electronically signed by Shelly Mancera MD at 02/23/24 0314          Physician Progress Notes (last 48 hours)        Shelly Mancera MD at 02/22/24 1929           cervix 4/80/-1.  Forebag ruptured clear.  IUPC placed.    Electronically signed by Shelly Mancera MD at 02/22/24 1930       Cristina Brooks MD at 02/22/24 1635          IUP @ 37 weeks  IUGR  MIOL  Unfavorable cervix  Now with epidural  Transcervical green placed per physician request.  FHT's class 1.  Patient tolerated well. 24 french, 60ml.    Cristina Brooks MD  2/22/2024  16:37 EST        Electronically signed by Cristina Brooks MD at 02/22/24 1637       Frederic Woods MD at 02/22/24 9849          Patient was unable to tolerate pacemaker Green balloon last night.  Little change in cervix after Cervidil overnight.    Decision made to place epidural so that patient could tolerate Green balloon placement.  Patient was allowed to eat breakfast this morning and anesthesia requested to wait several hours after eating prior to placing epidural.  Epidural was placed without difficulty at 12:30 PM.    Patient experienced spontaneous rupture of membranes at approximately 1 PM; which was prior to Green bulb placement.    Green balloon was inserted by laborist with speculum to posterior cervix.  Approximately 1 cm and 50%.    Patient is currently comfortable and sleeping with epidural.    Afebrile and vital signs stable.    FHTs are category 1.  No decelerations noted.  Contractions every 3 minutes and palpate as mild.    Plan is to continue Pitocin per protocol.  Possible need for IUPC to titrate Pitocin once Green balloon out and an active labor.    Discussed with Dr. Mancera who is on-call tonight.    Electronically signed by Frederic Woods MD at 02/22/24 2048       Anish Arnett MD at 02/22/24 5208         "  [facility]  Progress Note    Chief Complaint:  Chief Complaint   Patient presents with    Scheduled Induction       Subjective     Patient:    The patient feels well this morning with category I tracing overnight. A second attempt at green bulb placement performed in the middle of the night but patient does not tolerate exams well.  I explained that her cervix is now soft and would benefit from green bulb and could be placed once she is comfortable.       Objective     Vital Signs Range for the last 24 hours  Temp:  [97.7 °F (36.5 °C)] 97.7 °F (36.5 °C)   Temp src: Oral   BP: (122-129)/(78-89) 129/89                       Weight:  [77.1 kg (170 lb)-77.5 kg (170 lb 12.8 oz)] 77.1 kg (170 lb)       Flowsheet Rows      Flowsheet Row First Filed Value   Admission Height 162.6 cm (64\") Documented at 02/21/2024 1758   Admission Weight 77.1 kg (170 lb) Documented at 02/21/2024 1758              Physical Exam:  Abdomen Abdominal exam: uterus is nontender   Extremities Exam of extremities: peripheral pulses normal, no pedal edema, no clubbing or cyanosis     Presentation: vertex   Cervix: Exam by:  Anish Arnett MD     Dilation:  ft   Effacement:  50   Station:  -2         Fetal Heart Rate Assessment   Method:     Beats/min:     Baseline:     Variability:     Accels:     Decels:     Tracing Category:       Uterine Assessment   Method:     Frequency (min):     Ctx Count in 10 min:     Duration:     Intensity:     Intensity by IUPC:     Resting Tone:     Resting Tone by IUPC:     [place] Units:         Assessment & Plan       IUGR (intrauterine growth restriction) affecting care of mother    Pregnancy    Iron deficiency anemia during pregnancy    IUGR (intrauterine growth restriction) affecting care of mother, third trimester, fetus 1        Assessment:  1.  Intrauterine pregnancy at 37w0d weeks gestation with reactive fetal status.    2.  IUGR  3.  Obstetrical history significant for  IUGR and " "unfavorable cervix .  4.  GBS status: No results found for: \"GBSANTIGEN\".     Plan:  1. Discussed plan with patient and family and will let her eat this morning, followed by epidural placement.  Plan for green bulb placement with pitocin after she is comfortable.   2. Plan of care has been reviewed with patient.  3.  Risks, benefits of treatment plan have been discussed.  4.  All questions have been answered.  .      Anish Arnett MD  2/22/2024  06:51 EST      Electronically signed by Anish Arnett MD at 02/22/24 0658       Cristina Brooks MD at 02/21/24 1947          Patient unable to tolerate the speculum for the placement of the green bulb  I attempted to place it with my fingers but cervix is posterior, closed, thick, and firm - baby's cephalic presentation confirmed with handheld ultrasound, cervix is located behind the baby's head which is at 0 station    Will try cervidil for cervical ripening    Electronically signed by Cristina Brooks MD at 02/21/24 1950     Apgars 8 @ 1 and 9 @ 5  "

## 2024-02-23 NOTE — ANESTHESIA POSTPROCEDURE EVALUATION
Patient: Falguni Daniel    Procedure Summary       Date: 02/22/24 Room / Location:     Anesthesia Start: 1216 Anesthesia Stop: 02/23/24 0248    Procedure: LABOR ANALGESIA Diagnosis:     Scheduled Providers:  Provider: Juan Cespedes DO    Anesthesia Type: epidural ASA Status: 2            Anesthesia Type: epidural    Vitals  Vitals Value Taken Time   /76 02/23/24 0600   Temp 98.4 °F (36.9 °C) 02/23/24 0600   Pulse 88 02/23/24 0600   Resp 16 02/23/24 0600   SpO2             Post Anesthesia Care and Evaluation    Patient location during evaluation: bedside  Patient participation: complete - patient participated  Level of consciousness: awake and alert  Pain management: adequate    Airway patency: patent  Anesthetic complications: No anesthetic complications    Cardiovascular status: acceptable  Respiratory status: acceptable  Hydration status: acceptable  Post Neuraxial Block status: Motor and sensory function returned to baseline and No signs or symptoms of PDPH

## 2024-02-24 LAB
APTT SCREEN TO CONFIRM RATIO: 0.87 RATIO (ref 0–1.34)
BACTERIA SPEC AEROBE CULT: NORMAL
BASOPHILS # BLD AUTO: 0.05 10*3/MM3 (ref 0–0.2)
BASOPHILS NFR BLD AUTO: 0.5 % (ref 0–1.5)
CONFIRM APTT/NORMAL: 29.6 SEC (ref 0–47.6)
DEPRECATED RDW RBC AUTO: 43.9 FL (ref 37–54)
EOSINOPHIL # BLD AUTO: 0.27 10*3/MM3 (ref 0–0.4)
EOSINOPHIL NFR BLD AUTO: 2.6 % (ref 0.3–6.2)
ERYTHROCYTE [DISTWIDTH] IN BLOOD BY AUTOMATED COUNT: 13.9 % (ref 12.3–15.4)
HCT VFR BLD AUTO: 29.5 % (ref 34–46.6)
HGB BLD-MCNC: 9.9 G/DL (ref 12–15.9)
IMM GRANULOCYTES # BLD AUTO: 0.05 10*3/MM3 (ref 0–0.05)
IMM GRANULOCYTES NFR BLD AUTO: 0.5 % (ref 0–0.5)
LA 2 SCREEN W REFLEX-IMP: NORMAL
LYMPHOCYTES # BLD AUTO: 2.69 10*3/MM3 (ref 0.7–3.1)
LYMPHOCYTES NFR BLD AUTO: 25.4 % (ref 19.6–45.3)
MCH RBC QN AUTO: 29.8 PG (ref 26.6–33)
MCHC RBC AUTO-ENTMCNC: 33.6 G/DL (ref 31.5–35.7)
MCV RBC AUTO: 88.9 FL (ref 79–97)
MONOCYTES # BLD AUTO: 1.03 10*3/MM3 (ref 0.1–0.9)
MONOCYTES NFR BLD AUTO: 9.7 % (ref 5–12)
NEUTROPHILS NFR BLD AUTO: 6.48 10*3/MM3 (ref 1.7–7)
NEUTROPHILS NFR BLD AUTO: 61.3 % (ref 42.7–76)
NRBC BLD AUTO-RTO: 0 /100 WBC (ref 0–0.2)
PLATELET # BLD AUTO: 181 10*3/MM3 (ref 140–450)
PMV BLD AUTO: 10.4 FL (ref 6–12)
RBC # BLD AUTO: 3.32 10*6/MM3 (ref 3.77–5.28)
SCREEN APTT: 38.7 SEC (ref 0–43.5)
SCREEN DRVVT: 27.8 SEC (ref 0–47)
THROMBIN TIME: 13.5 SEC (ref 0–23)
WBC NRBC COR # BLD AUTO: 10.57 10*3/MM3 (ref 3.4–10.8)

## 2024-02-24 PROCEDURE — 85025 COMPLETE CBC W/AUTO DIFF WBC: CPT | Performed by: OBSTETRICS & GYNECOLOGY

## 2024-02-24 RX ORDER — POLYETHYLENE GLYCOL 3350 17 G/17G
17 POWDER, FOR SOLUTION ORAL DAILY PRN
Status: DISCONTINUED | OUTPATIENT
Start: 2024-02-24 | End: 2024-02-25 | Stop reason: HOSPADM

## 2024-02-24 RX ADMIN — IBUPROFEN 600 MG: 600 TABLET, FILM COATED ORAL at 02:01

## 2024-02-24 RX ADMIN — DOCUSATE SODIUM 100 MG: 100 CAPSULE, LIQUID FILLED ORAL at 08:25

## 2024-02-24 RX ADMIN — IBUPROFEN 600 MG: 600 TABLET, FILM COATED ORAL at 22:44

## 2024-02-24 RX ADMIN — IBUPROFEN 600 MG: 600 TABLET, FILM COATED ORAL at 16:16

## 2024-02-24 RX ADMIN — ACETAMINOPHEN 650 MG: 325 TABLET ORAL at 20:26

## 2024-02-24 RX ADMIN — Medication 1 APPLICATION: at 06:30

## 2024-02-24 RX ADMIN — Medication: at 06:30

## 2024-02-24 RX ADMIN — WITCH HAZEL 1 PAD: 500 SOLUTION RECTAL; TOPICAL at 06:30

## 2024-02-24 RX ADMIN — ACETAMINOPHEN 650 MG: 325 TABLET ORAL at 06:19

## 2024-02-24 RX ADMIN — IBUPROFEN 600 MG: 600 TABLET, FILM COATED ORAL at 10:08

## 2024-02-24 NOTE — PLAN OF CARE
Goal Outcome Evaluation:           Progress: improving  Outcome Evaluation: VSS, voids, breast pumping/feeding, doing well. Lochia minimal amt. taking tyl. and motrin for pain.

## 2024-02-24 NOTE — PROGRESS NOTES
Postpartum Progress Note    Patient name: Falguni Daniel  YOB: 2005   MRN: [id number]  Referring Provider: Frederic Woods MD  Admission Date: 2024  Date of Service: 2024    ID: 18 y.o.     Diagnosis:   S/p vaginal delivery     IUGR (intrauterine growth restriction) affecting care of mother    Pregnancy    Iron deficiency anemia during pregnancy    IUGR (intrauterine growth restriction) affecting care of mother, third trimester, fetus 1       Subjective:      No complaints.  Moderate lochia.  Ambulating, voiding, tolerating diet.  Pain well controlled.  The patient is currently breastfeeding.   This baby is a female.    Objective:      Vital signs:  Vital Signs Range for the last 24 hours  Temperature: Temp:  [97.6 °F (36.4 °C)-98.9 °F (37.2 °C)] 97.9 °F (36.6 °C)   Temp Source: Temp src: Oral   BP: BP: (120-127)/(75-89) 120/75   Pulse: Heart Rate:  [61-80] 61   Respirations: Resp:  [16-18] 16   Weight: 77.1 kg (170 lb)     General: Alert & oriented x4, in no apparent distress  Abdomen: soft, nontender  Uterus: firm, nontender  Extremities: nontender; no edema      Labs:  Lab Results   Component Value Date    WBC 10.57 2024    HGB 9.9 (L) 2024    HCT 29.5 (L) 2024    MCV 88.9 2024     2024     Results from last 7 days   Lab Units 24  1859   ABO TYPING  O   RH TYPING  Positive     External Prenatal Results       Pregnancy Outside Results - Transcribed From Office Records - See Scanned Records For Details       Test Value Date Time    ABO  O  24 1859    Rh  Positive  24 185    Antibody Screen  Negative  24 1830    Varicella IgG       Rubella       Hgb  9.9 g/dL 24 0641       11.4 g/dL 24 1842       11.6 g/dL 24 1218    Hct  29.5 % 24 0641       33.3 % 24 1842       34.5 % 24 1218    Glucose Fasting GTT       Glucose Tolerance Test 1 hour       Glucose Tolerance Test 3 hour        Gonorrhea (discrete)       Chlamydia (discrete)       RPR       VDRL       Syphilis Antibody       HBsAg       Herpes Simplex Virus PCR       Herpes Simplex VIrus Culture       HIV       Hep C RNA Quant PCR       Hep C Antibody       AFP       Group B Strep  No Group B Streptococcus isolated  02/21/24 1816    GBS Susceptibility to Clindamycin       GBS Susceptibility to Erythromycin       Fetal Fibronectin       Genetic Testing, Maternal Blood                 Drug Screening       Test Value Date Time    Urine Drug Screen       Amphetamine Screen       Barbiturate Screen       Benzodiazepine Screen       Methadone Screen       Phencyclidine Screen       Opiates Screen       THC Screen       Cocaine Screen       Propoxyphene Screen       Buprenorphine Screen       Methamphetamine Screen       Oxycodone Screen       Tricyclic Antidepressants Screen                 Legend    ^: Historical                            Assessment/Plan:      PPD#1 s/p vaginal delivery.  1. S/p vaginal delivery: Doing well.Continue routine postpartum care.    2. Infant feeding: Supportive care.  The patient is currently breastfeeding.  3. Postpartum anemia: stable no s/s, continue home iron tomorrow on dc.  4.Constipation: Continue Stool softeners twice daily. Miralax ordered PRN.

## 2024-02-24 NOTE — LACTATION NOTE
Per RN, pt. Has been pumping, offering breast, then feeding EBM. Discussed latching and nursing to allow baby to practice getting the most colostrum out of the breast that she can, then skin-to-skin x 10 min, then pumping. Could also nurse baby on one side and pump the other side. Pt. Verbalized understanding. She states baby just gets very sleepy on the breast.

## 2024-02-25 VITALS
TEMPERATURE: 97.8 F | RESPIRATION RATE: 16 BRPM | DIASTOLIC BLOOD PRESSURE: 81 MMHG | HEIGHT: 64 IN | BODY MASS INDEX: 29.02 KG/M2 | HEART RATE: 71 BPM | SYSTOLIC BLOOD PRESSURE: 119 MMHG | WEIGHT: 170 LBS

## 2024-02-25 RX ORDER — IBUPROFEN 600 MG/1
600 TABLET ORAL EVERY 6 HOURS PRN
Qty: 60 TABLET | Refills: 0 | Status: SHIPPED | OUTPATIENT
Start: 2024-02-25

## 2024-02-25 RX ORDER — PSEUDOEPHEDRINE HCL 30 MG
100 TABLET ORAL 2 TIMES DAILY PRN
Qty: 30 CAPSULE | Refills: 0 | Status: SHIPPED | OUTPATIENT
Start: 2024-02-25

## 2024-02-25 RX ORDER — HYDROCODONE BITARTRATE AND ACETAMINOPHEN 5; 325 MG/1; MG/1
1 TABLET ORAL EVERY 6 HOURS PRN
Qty: 4 TABLET | Refills: 0 | Status: SHIPPED | OUTPATIENT
Start: 2024-02-25 | End: 2024-02-26

## 2024-02-25 RX ADMIN — HYDROCODONE BITARTRATE AND ACETAMINOPHEN 1 TABLET: 5; 325 TABLET ORAL at 12:21

## 2024-02-25 RX ADMIN — ACETAMINOPHEN 650 MG: 325 TABLET ORAL at 10:24

## 2024-02-25 RX ADMIN — IBUPROFEN 600 MG: 600 TABLET, FILM COATED ORAL at 06:05

## 2024-02-25 RX ADMIN — DOCUSATE SODIUM 100 MG: 100 CAPSULE, LIQUID FILLED ORAL at 08:21

## 2024-02-25 RX ADMIN — HYDROCODONE BITARTRATE AND ACETAMINOPHEN 1 TABLET: 5; 325 TABLET ORAL at 01:55

## 2024-02-25 RX ADMIN — HYDROCODONE BITARTRATE AND ACETAMINOPHEN 1 TABLET: 5; 325 TABLET ORAL at 08:21

## 2024-02-25 NOTE — LACTATION NOTE
02/25/24 1105   Maternal Information   Date of Referral 02/25/24   Person Making Referral lactation consultant  (follow up consult)   Maternal Reason for Referral no prior breastfeeding experience   Infant Reason for Referral   (breastfeeding is going well;--offering 1 breast with each feeding. Pumping the other breast--getting a mL or two. Offering formula after breastfeeding,)   Milk Expression/Equipment   Breast Pump Type double electric, personal  (using spectra breast pump)   Breast Pump Flange Type hard   Breast Pump Flange Size 24 mm   Breast Pumping   Breast Pumping Interventions post-feed pumping encouraged  (Encouraged to pump after feedings until baby is closer to term. Or she can come to outpatient lactation clinic for pre- and post-weight before she quits pumping. We can make sure baby is getting enough with breastfeeding.)

## 2024-02-25 NOTE — DISCHARGE SUMMARY
Discharge Summary    Date of Admission: 2024  Date of Discharge:  2024      Patient: Falguni Daniel      MR#:[id number]    Delivery Provider: Shelly Mancera     Attending Provider: Frederic Woods MD    Presenting Problem/History of Present Illness  IUGR (intrauterine growth restriction) affecting care of mother [O36.5990]       IUGR (intrauterine growth restriction) affecting care of mother    Pregnancy    Iron deficiency anemia during pregnancy    IUGR (intrauterine growth restriction) affecting care of mother, third trimester, fetus 1         Discharge Diagnosis: Vaginal delivery at 37w1d    Procedures:  Vaginal, Spontaneous     2024    2:45 AM        Discharge Date: 2024;     Hospital Course  Patient is a 18 y.o. female  at 37w1d status post vaginal delivery without complication. Postpartum the patient did well. She remained afebrile, with vital signs stable. She was ready for discharge on postpartum day 2.     Infant:   female  fetus 2450 g (5 lb 6.4 oz)  with Apgar scores of 8 , 9  at five minutes.    Condition on Discharge:  Stable    Vital Signs  Temp:  [97.8 °F (36.6 °C)-98.2 °F (36.8 °C)] 97.8 °F (36.6 °C)  Heart Rate:  [71-85] 71  Resp:  [16] 16  BP: (112-121)/(64-83) 119/81    Lab Results   Component Value Date    WBC 10.57 2024    HGB 9.9 (L) 2024    HCT 29.5 (L) 2024    MCV 88.9 2024     2024       Discharge Disposition  Home or Self Care    Discharge Medications     Discharge Medications        New Medications        Instructions Start Date   docusate sodium 100 MG capsule   100 mg, Oral, 2 Times Daily PRN      HYDROcodone-acetaminophen 5-325 MG per tablet  Commonly known as: NORCO   1 tablet, Oral, Every 6 Hours PRN      ibuprofen 600 MG tablet  Commonly known as: ADVIL,MOTRIN   600 mg, Oral, Every 6 Hours PRN             Continue These Medications        Instructions Start Date   acetaminophen 650 MG 8 hr tablet  Commonly known  as: TYLENOL   650 mg, Oral, Every 8 Hours PRN      ferrous sulfate 325 (65 FE) MG tablet   325 mg, Oral, Daily With Breakfast      prenatal (CLASSIC) vitamin 28-0.8 MG tablet tablet  Generic drug: prenatal vitamin   Oral, Daily               Discharge Diet:   Diet Instructions       Diet: Regular/House Diet; Regular Texture (IDDSI 7); Thin (IDDSI 0)      Discharge Diet: Regular/House Diet    Texture: Regular Texture (IDDSI 7)    Fluid Consistency: Thin (IDDSI 0)            Activity at Discharge:   Activity Instructions       Bathing Restrictions      Type of Restriction:  Bathing  Sex       Explain Sexual Activity Restrictions: 6 weeks    Bathing Restrictions: No Tub Bath    Pelvic Rest              Follow-up Appointments  No future appointments.  Additional Instructions for the Follow-ups that You Need to Schedule       Call MD With Problems / Concerns   As directed      Instructions: Discussed changes in postpartum mood-anxiety, depression, sadness to call office for evaluation as soon as symptoms arise even if before 6 week appointment. Monitor for excessive bleeding >1 pad/hr for several hours, dizziness, syncope, fever or changes in blood pressure.    Order Comments: Instructions: Discussed changes in postpartum mood-anxiety, depression, sadness to call office for evaluation as soon as symptoms arise even if before 6 week appointment. Monitor for excessive bleeding >1 pad/hr for several hours, dizziness, syncope, fever or changes in blood pressure.         Discharge Follow-up with Specified Provider: ; 6 Weeks   As directed      To:    Follow Up: 6 Weeks              Patient is having trouble tolerating pain-norco x4 tab encouraged more ambulation and motrin/tylenol combo, ice packs, sitz baths.    Jolynn Ambriz, APRN  02/25/24  11:57 EST  Csd

## 2024-02-28 NOTE — PROGRESS NOTES
Patient seen in Maternal Fetal Medicine clinic today. Please see full note in under imaging tab of patient chart in Epic (Viewpoint report).    Beverly Moore MD

## 2024-03-11 PROBLEM — Z15.89 HETEROZYGOUS MTHFR MUTATION A1298C: Status: ACTIVE | Noted: 2024-03-11

## 2024-03-11 LAB
IMP & REVIEW OF LAB RESULTS: NORMAL
MTHFR GENE MUT ANL BLD/T: NORMAL